# Patient Record
Sex: MALE | Race: WHITE | NOT HISPANIC OR LATINO | Employment: FULL TIME | URBAN - METROPOLITAN AREA
[De-identification: names, ages, dates, MRNs, and addresses within clinical notes are randomized per-mention and may not be internally consistent; named-entity substitution may affect disease eponyms.]

---

## 2021-11-01 ENCOUNTER — TELEPHONE (OUTPATIENT)
Dept: PEDIATRICS | Facility: OTHER | Age: 41
End: 2021-11-01

## 2021-11-01 NOTE — TELEPHONE ENCOUNTER
HUB TO SHARE: UNABLE TO LEAVE VM. OUR RECORDS SHOW PATIENT HAS NOT BEEN SEEN SINCE 2012 THEREFORE WOULD BE A NEW PATIENT AND WE ARE UNABLE TO ACCEPT NEW PATIENTS AT THIS TIME.

## 2021-11-02 NOTE — TELEPHONE ENCOUNTER
PATIENT RETURNED CALL, HUB RELAYED MESSAGE LABELED HUB TO SHARE     HUB SCHEDULED PATIENT WITH ANOTHER OFFICE

## 2021-12-09 ENCOUNTER — OFFICE VISIT (OUTPATIENT)
Dept: FAMILY MEDICINE CLINIC | Facility: CLINIC | Age: 41
End: 2021-12-09

## 2021-12-09 VITALS
WEIGHT: 184.6 LBS | DIASTOLIC BLOOD PRESSURE: 80 MMHG | OXYGEN SATURATION: 98 % | TEMPERATURE: 97.7 F | HEART RATE: 81 BPM | HEIGHT: 72 IN | RESPIRATION RATE: 18 BRPM | SYSTOLIC BLOOD PRESSURE: 125 MMHG | BODY MASS INDEX: 25 KG/M2

## 2021-12-09 DIAGNOSIS — Z13.220 SCREENING FOR LIPID DISORDERS: ICD-10-CM

## 2021-12-09 DIAGNOSIS — Z13.1 SCREENING FOR DIABETES MELLITUS: ICD-10-CM

## 2021-12-09 DIAGNOSIS — Z76.89 ESTABLISHING CARE WITH NEW DOCTOR, ENCOUNTER FOR: Primary | ICD-10-CM

## 2021-12-09 PROCEDURE — 99386 PREV VISIT NEW AGE 40-64: CPT | Performed by: FAMILY MEDICINE

## 2021-12-09 NOTE — PROGRESS NOTES
Chief Complaint   Patient presents with   • Establish Care       Subjective   Sanya Soares is a 41 y.o. male.     Patient states that he is here to [facility] for care. The patient has no issues at this time.     I have reviewed relevant past medical, family, social and surgical history for this patient.  Medications review is done by myself, with patient.     Patient was recently seen in the  for an abnormal appearing skin lesion.  He has already seen dermatology for this lesion.  He has no new skin concerns.      Past Medical History :  Active Ambulatory Problems     Diagnosis Date Noted   • No Active Ambulatory Problems     Resolved Ambulatory Problems     Diagnosis Date Noted   • No Resolved Ambulatory Problems     No Additional Past Medical History       Medication List:  No current outpatient medications on file.    No Known Allergies    Social History     Tobacco Use   • Smoking status: Former Smoker     Packs/day: 1.00     Years: 4.00     Pack years: 4.00     Types: Cigarettes     Quit date: 1999     Years since quittin.0   • Smokeless tobacco: Never Used   Substance Use Topics   • Alcohol use: Never       Review of Systems   Constitutional: Negative for appetite change, chills, fatigue, fever, unexpected weight gain and unexpected weight loss.   HENT: Negative for congestion, ear pain, hearing loss, sore throat and swollen glands.    Eyes: Negative for blurred vision, double vision and visual disturbance.   Respiratory: Negative for cough and shortness of breath.    Cardiovascular: Negative for chest pain, palpitations and leg swelling.   Gastrointestinal: Negative for abdominal pain, blood in stool, constipation, diarrhea, nausea and vomiting.   Endocrine: Negative for polydipsia and polyuria.   Genitourinary: Negative for decreased urine volume, difficulty urinating, dysuria, genital sores, hematuria and testicular pain.   Musculoskeletal: Negative for arthralgias, back pain, joint swelling  "and neck stiffness.   Skin: Negative for rash and skin lesions (managed by dermatology).   Neurological: Negative for dizziness, seizures, syncope, weakness and headache.   Hematological: Negative for adenopathy. Does not bruise/bleed easily.   Psychiatric/Behavioral: Negative for sleep disturbance and depressed mood. The patient is not nervous/anxious.          Objective   Vitals:    12/09/21 1506   BP: 125/80   Pulse: 81   Resp: 18   Temp: 97.7 °F (36.5 °C)   TempSrc: Temporal   SpO2: 98%   Weight: 83.7 kg (184 lb 9.6 oz)   Height: 182.9 cm (72\")     Body mass index is 25.04 kg/m².    Physical Exam  Constitutional:       General: He is not in acute distress.     Appearance: Normal appearance. He is well-developed. He is not ill-appearing.   HENT:      Head: Normocephalic and atraumatic.      Right Ear: Tympanic membrane, ear canal and external ear normal.      Left Ear: Tympanic membrane, ear canal and external ear normal.      Nose: Nose normal.      Mouth/Throat:      Mouth: Mucous membranes are moist.      Pharynx: No oropharyngeal exudate or posterior oropharyngeal erythema.   Eyes:      General: No scleral icterus.        Right eye: No discharge.         Left eye: No discharge.      Extraocular Movements: Extraocular movements intact.      Conjunctiva/sclera: Conjunctivae normal.      Pupils: Pupils are equal, round, and reactive to light.   Neck:      Thyroid: No thyromegaly.      Vascular: No carotid bruit or JVD.      Trachea: No tracheal deviation.   Cardiovascular:      Rate and Rhythm: Normal rate and regular rhythm.      Heart sounds: Normal heart sounds. No murmur heard.  No friction rub. No gallop.    Pulmonary:      Effort: Pulmonary effort is normal. No respiratory distress.      Breath sounds: Normal breath sounds. No wheezing or rales.   Abdominal:      General: Bowel sounds are normal.      Palpations: Abdomen is soft.      Tenderness: There is no abdominal tenderness. There is no guarding or " rebound.   Genitourinary:     Comments: Exam not performed.  Patient declines/no concerns.  Musculoskeletal:         General: No swelling, deformity or signs of injury. Normal range of motion.      Cervical back: Normal range of motion and neck supple. No tenderness.      Right lower leg: No edema.      Left lower leg: No edema.   Lymphadenopathy:      Cervical: No cervical adenopathy.   Skin:     General: Skin is warm.      Capillary Refill: Capillary refill takes less than 2 seconds.      Findings: No erythema or rash.   Neurological:      General: No focal deficit present.      Mental Status: He is alert and oriented to person, place, and time. Mental status is at baseline.      Cranial Nerves: No cranial nerve deficit.      Motor: No weakness or abnormal muscle tone.      Coordination: Coordination normal.      Gait: Gait normal.      Deep Tendon Reflexes: Reflexes normal.   Psychiatric:         Mood and Affect: Mood normal.         Behavior: Behavior normal.         Thought Content: Thought content normal.         Judgment: Judgment normal.         Assessment/Plan     Diagnoses and all orders for this visit:    1. Establishing care with new doctor, encounter for (Primary)    2. Screening for diabetes mellitus  -     Basic metabolic panel    3. Screening for lipid disorders  -     Lipid Panel With / Chol / HDL Ratio    Questions and concerns presented by patient today addressed.  All questions posed answered.  Fasting labs ordered.  Health maintenance topics discussed.  Tdap, flu and COVID vaccinations declined by patient.    Skin lesions/concerns per dermatology.    Return if symptoms worsen or fail to improve.       Patient was given instructions and counseling regarding his/her condition or for health maintenance advice. Please see specific information pulled into the AVS if appropriate.     I wore protective equipment throughout this patient encounter to include mask. Hand hygiene was performed before  donning protective equipment and after removal when leaving the room.

## 2023-04-10 NOTE — TELEPHONE ENCOUNTER
Caller: Sanya Soares    Relationship to patient: Self    Best call back number: [phone]    Patient is needing: PATIENT CALLED AND SAID THAT HE WAS A PATIENT OF DR. ARMIJO, BUT HE HAS NOT RECORD WITH US IN Epic    HE SAID HE HAD SEEN HIM LAST SOMETIME IN THE LAST COUPLE OF YEARS     WANTING TO SEE IF HE CAN STILL BE SCHEDULED WITH THE OFFICE    negative - no change in level of consciousness